# Patient Record
Sex: MALE | Race: WHITE | NOT HISPANIC OR LATINO | Employment: FULL TIME | URBAN - METROPOLITAN AREA
[De-identification: names, ages, dates, MRNs, and addresses within clinical notes are randomized per-mention and may not be internally consistent; named-entity substitution may affect disease eponyms.]

---

## 2018-11-04 ENCOUNTER — HOSPITAL ENCOUNTER (EMERGENCY)
Facility: HOSPITAL | Age: 33
Discharge: HOME | End: 2018-11-04
Attending: EMERGENCY MEDICINE | Admitting: EMERGENCY MEDICINE
Payer: COMMERCIAL

## 2018-11-04 ENCOUNTER — APPOINTMENT (EMERGENCY)
Dept: RADIOLOGY | Facility: HOSPITAL | Age: 33
End: 2018-11-04
Payer: COMMERCIAL

## 2018-11-04 VITALS
TEMPERATURE: 98.3 F | WEIGHT: 180.56 LBS | BODY MASS INDEX: 14.68 KG/M2 | RESPIRATION RATE: 20 BRPM | DIASTOLIC BLOOD PRESSURE: 50 MMHG | SYSTOLIC BLOOD PRESSURE: 102 MMHG | HEART RATE: 85 BPM

## 2018-11-04 DIAGNOSIS — S86.019A: Primary | ICD-10-CM

## 2018-11-04 PROCEDURE — 73610 X-RAY EXAM OF ANKLE: CPT

## 2018-11-04 PROCEDURE — 99283 EMERGENCY DEPT VISIT LOW MDM: CPT

## 2018-11-04 RX ORDER — NAPROXEN 500 MG/1
500 TABLET ORAL 2 TIMES DAILY WITH MEALS
Qty: 30 TABLET | Refills: 0 | Status: SHIPPED | OUTPATIENT
Start: 2018-11-04

## 2018-11-04 RX ORDER — IBUPROFEN 400 MG/1
800 TABLET ORAL ONCE
Status: COMPLETED | OUTPATIENT
Start: 2018-11-04 | End: 2018-11-04

## 2018-11-04 RX ORDER — OXYCODONE HYDROCHLORIDE AND ACETAMINOPHEN 5; 325 MG/1; MG/1
1 TABLET ORAL ONCE
Status: COMPLETED | OUTPATIENT
Start: 2018-11-04 | End: 2018-11-04

## 2018-11-04 RX ORDER — OXYCODONE HYDROCHLORIDE AND ACETAMINOPHEN 5; 325 MG/1; MG/1
1 TABLET ORAL EVERY 6 HOURS PRN
Qty: 20 TABLET | Refills: 0 | Status: SHIPPED | OUTPATIENT
Start: 2018-11-04 | End: 2018-11-14

## 2018-11-04 RX ADMIN — IBUPROFEN 800 MG: 400 TABLET ORAL at 16:10

## 2018-11-04 RX ADMIN — OXYCODONE AND ACETAMINOPHEN 1 TABLET: 5; 325 TABLET ORAL at 16:09

## 2018-11-04 NOTE — DISCHARGE INSTRUCTIONS
Achilles Tendon Rupture   WHAT YOU NEED TO KNOW:   What is an Achilles tendon rupture? An Achilles tendon rupture happens when your Achilles tendon tears, or separates from your heel bone  The Achilles tendon connects your calf muscle to your heel bone  It allows you to point your foot down and to rise on your toes  An Achilles tendon rupture may be caused by a sports injury or a fall  What increases my risk for an Achilles tendon rupture? · A previous Achilles tendon tear, rupture, or tendinitis    · A sudden increase in the intensity of an activity    · Tight calf muscles    · Arthritis, gout, or lupus    · Age older than 30    · Medicines such as steroids or antibiotics  What are the signs and symptoms of an Achilles tendon rupture? · A sudden pop, snap, or crack at the back of your leg    · Severe pain in your leg or ankle, especially when your foot is bent down    · Swelling, stiffness, or weakness in your leg or ankle    · A bruise on the back of your ankle    · Trouble moving or putting weight on your leg  How is an Achilles tendon rupture diagnosed? Tell your healthcare provider what you were doing when your symptoms started  He will bend your knee and squeeze the lower part of your calf  If your foot or ankle does not move, the tendon is torn  You may need an x-ray, ultrasound, or MRI to check for a tear or damage to your Achilles tendon  Do not enter the MRI room with anything metal  Metal can cause serious injury  Tell the healthcare provider if you have any metal in or on your body  How is an Achilles tendon rupture treated? · Medicine  can help decrease pain and swelling  · A cast, brace, or splint  may be used to keep your foot from moving  This will help your tendon heal  Your healthcare provider may change your cast and your foot position several times while the tendon heals  After several weeks of a cast or splint, you may need heel lifts placed in your shoes   This will decrease stress on your Achilles tendon  · Physical therapy  may be needed  A physical therapist teaches you exercises to help improve movement and strength, and decrease pain  You may not start physical therapy for a few weeks or until your cast is removed  · Surgery  may be needed if other treatments do not work  The edges of your tendon may need to be stitched back together  You may need a graft to patch the tear  A graft is a piece of another tendon or artificial material   What can I do to manage my symptoms? · Use a support device as directed  You may need crutches or a cane to decrease stress and pressure on your tendon  Your healthcare provider will tell you how much weight you can put on your leg  Ask for more information about how to use crutches or a cane correctly  Wear your brace or splint as directed  This devices will keep your tendon straight and help it heal      · Rest  as directed  Your healthcare provider will tell you when it is okay to walk and play sports  You may not be able to play sports for 6 months or longer  Ask when you can go back to work or school  Do not drive until your healthcare provider says it is okay  · Apply ice  on your Achilles tendon for 15 to 20 minutes every hour or as directed  Use an ice pack, or put crushed ice in a plastic bag  Cover it with a towel  Ice helps prevent tissue damage and decreases swelling and pain  · Elevate  your heel above the level of your heart as often as you can  This will help decrease swelling and pain  Prop your heel on pillows or blankets to keep it elevated comfortably  When should I seek immediate care? · Your leg feels warm, tender, and painful  It may look swollen and red  · Your foot or toes are numb  When should I contact my healthcare provider? · You have a fever  · Your symptoms do not get better with treatment  · You have questions or concerns about your condition or care    CARE AGREEMENT:   You have the right to help plan your care  Learn about your health condition and how it may be treated  Discuss treatment options with your caregivers to decide what care you want to receive  You always have the right to refuse treatment  The above information is an  only  It is not intended as medical advice for individual conditions or treatments  Talk to your doctor, nurse or pharmacist before following any medical regimen to see if it is safe and effective for you  © 2017 [employer]  Information is for End User's use only and may not be sold, redistributed or otherwise used for commercial purposes  All illustrations and images included in CareNotes® are the copyrighted property of [employer]  or [employer]

## 2018-11-04 NOTE — ED PROVIDER NOTES
History  Chief Complaint   Patient presents with    Leg Injury     injury to right lower ext  while playing softball, felt a pull and pop while running     78-year-old male presents to the emergency department for evaluation of acute onset of right calf pain  Patient states that he was playing softball, he was rounding 2nd base when he felt a sharp pain in the calf and felt a pop sensation  Patient states that he was able to walk off field but is not able to bear weight on the leg now without severe pain  Patient did not fall and denies other associated injuries  Patient localizes pain to the mid calf on the right  He has pain when he attempts to plantar flex the foot  No knee or ankle pain  History provided by:  Patient, medical records and significant other  Leg Pain   Location:  Leg  Injury: yes    Mechanism of injury comment:  Felt a pop sensation when running  Leg location:  R lower leg  Pain details:     Quality:  Tearing and throbbing    Radiates to:  Does not radiate    Severity:  Severe    Onset quality:  Sudden    Timing:  Constant    Progression:  Unchanged  Chronicity:  New  Dislocation: no    Foreign body present:  No foreign bodies  Prior injury to area:  No  Relieved by:  Nothing  Worsened by:  Bearing weight and flexion  Ineffective treatments:  None tried  Associated symptoms: muscle weakness and swelling    Associated symptoms: no back pain, no fatigue and no fever    Risk factors: no obesity        None       No past medical history on file  Past Surgical History:   Procedure Laterality Date    BLADDER SURGERY         No family history on file  I have reviewed and agree with the history as documented  Social History   Substance Use Topics    Smoking status: Current Every Day Smoker    Smokeless tobacco: Not on file    Alcohol use No        Review of Systems   Constitutional: Negative for fatigue and fever  Musculoskeletal: Positive for gait problem and myalgias  Negative for arthralgias and back pain  All other systems reviewed and are negative  Physical Exam  Physical Exam   Constitutional: He is oriented to person, place, and time  He appears well-developed and well-nourished  HENT:   Head: Normocephalic  Right Ear: External ear normal    Left Ear: External ear normal    Nose: Nose normal    Mouth/Throat: Oropharynx is clear and moist    Eyes: Pupils are equal, round, and reactive to light  EOM and lids are normal    Neck: Normal range of motion  Neck supple  Pulmonary/Chest: Effort normal  No respiratory distress  Musculoskeletal: Normal range of motion  He exhibits no deformity  Right ankle: Achilles tendon exhibits pain and defect (no complete disruption of tendon)  Right lower leg: He exhibits tenderness and swelling  He exhibits no deformity  Legs:       Right foot: Normal    Ortiz test is abnormal - the foot has minimal flexion with squeezing of the calf while the patient is kneeling with foot hanging off the chair   Neurological: He is alert and oriented to person, place, and time  Skin: Skin is warm and dry  Psychiatric: He has a normal mood and affect  Nursing note and vitals reviewed        Vital Signs  ED Triage Vitals   Temperature Pulse Respirations Blood Pressure SpO2   11/04/18 1549 11/04/18 1549 11/04/18 1549 11/04/18 1549 --   98 3 °F (36 8 °C) 85 20 102/50       Temp src Heart Rate Source Patient Position - Orthostatic VS BP Location FiO2 (%)   -- -- -- -- --             Pain Score       11/04/18 1609       8           Vitals:    11/04/18 1549   BP: 102/50   Pulse: 85       Visual Acuity      ED Medications  Medications   ibuprofen (MOTRIN) tablet 800 mg (800 mg Oral Given 11/4/18 1610)   oxyCODONE-acetaminophen (PERCOCET) 5-325 mg per tablet 1 tablet (1 tablet Oral Given 11/4/18 1609)       Diagnostic Studies  Results Reviewed     None                 XR ankle 3+ views RIGHT    (Results Pending) Fullness/edema in the achilles triangle   Procedures  Static Splint Application  Date/Time: 11/4/2018 5:18 PM  Performed by: LADAN AMEZQUITA  Authorized by: Francisca Boothe     Patient location:  Bedside  Procedure performed by emergency physician: No    Other Assisting Provider: Yes (comment)    Consent:     Consent obtained:  Verbal    Consent given by:  Patient    Risks discussed:  Pain and swelling  Universal protocol:     Patient identity confirmed:  Verbally with patient  Indication:     Indications: sprain/strain and other medical problem (comment)    Pre-procedure details:     Sensation:  Normal  Procedure details:     Laterality:  Right    Location:  Leg    Leg:  R lower leg    Splint type:  Short leg    Supplies:  Ortho-Glass  Post-procedure details:     Pain:  Improved    Sensation:  Normal    Neurovascular Exam: skin pink      Patient tolerance of procedure: Tolerated well, no immediate complications           Phone Contacts  ED Phone Contact    ED Course                               MDM  Number of Diagnoses or Management Options  Partial rupture of Achilles tendon, initial encounter: new and requires workup     Amount and/or Complexity of Data Reviewed  Tests in the radiology section of CPT®: ordered and reviewed  Decide to obtain previous medical records or to obtain history from someone other than the patient: yes  Obtain history from someone other than the patient: yes  Independent visualization of images, tracings, or specimens: yes    Risk of Complications, Morbidity, and/or Mortality  General comments: 65-year-old male with acute right calf pain while running - patient has significant pain in the right calf and appears to have weakness with plantar flexion of the foot  I suspect that he has a partial Achilles tendon disruption  His splint has been placed and he has been given crutchesAnd pain medication  He will contact Orthopedics for follow-up tomorrow      Patient Progress  Patient progress: stable    CritCare Time    Disposition  Final diagnoses:   Partial rupture of Achilles tendon, initial encounter     Time reflects when diagnosis was documented in both MDM as applicable and the Disposition within this note     Time User Action Codes Description Comment    11/4/2018  4:36 PM Yu Soliman Add [[id number]] Partial rupture of Achilles tendon, initial encounter       ED Disposition     ED Disposition Condition Comment    Discharge  Payal Junior discharge to home/self care  Condition at discharge: Stable        Follow-up Information     Follow up With Specialties Details Why Contact Info Additional [address] [facility] [place] [facility] Schedule an appointment as soon as possible for a visit in 1 day For recheck of current symptoms [facility] [phone]  [address], [address]          Discharge Medication List as of 11/4/2018  4:38 PM      START taking these medications    Details   naproxen (NAPROSYN) 500 mg tablet Take 1 tablet (500 mg total) by mouth 2 (two) times a day with meals, Starting Sun 11/4/2018, Normal      oxyCODONE-acetaminophen (PERCOCET) 5-325 mg per tablet Take 1 tablet by mouth every 6 (six) hours as needed for moderate pain (May use up to two tablets every 6 hours ) for up to 10 days Max Daily Amount: 4 tablets, Starting Sun 11/4/2018, Until Wed 11/14/2018, Print           No discharge procedures on file      ED Provider  Electronically Signed by           Carolyn Cummings DO  11/04/18 6638

## 2018-11-04 NOTE — ED NOTES
Pulses and neuro checked in right foot and toes before and after placement of splint  No deficits noted       Delfina Enrique  11/04/18 5211

## 2018-11-06 VITALS
SYSTOLIC BLOOD PRESSURE: 112 MMHG | HEIGHT: 68 IN | DIASTOLIC BLOOD PRESSURE: 71 MMHG | BODY MASS INDEX: 27.45 KG/M2 | HEART RATE: 73 BPM

## 2018-11-06 DIAGNOSIS — S86.111A GASTROCNEMIUS STRAIN, RIGHT, INITIAL ENCOUNTER: Primary | ICD-10-CM

## 2018-11-06 PROCEDURE — 99203 OFFICE O/P NEW LOW 30 MIN: CPT | Performed by: ORTHOPAEDIC SURGERY

## 2018-11-06 NOTE — PROGRESS NOTES
JEFE Kohler  Attending, Orthopaedic Surgery  [facility] [address] [facility]        ORTHOPAEDIC FOOT AND ANKLE CLINIC VISIT     Assessment:     Encounter Diagnosis   Name Primary?  Gastrocnemius strain, right, initial encounter Yes        Plan:   · The patient verbalized understanding of exam findings and treatment plan  We engaged in the shared decision-making process and treatment options were discussed at length with the patient  Surgical and conservative management discussed today along with risks and benefits  · He is to wear high tide cam walker boot  · May continue with naproxen for pain control  · He may initiate RICE  · Work restrictions given  Return in about 4 weeks (around 12/4/2018) for Recheck right gastroc  Alda Prows History of Present Illness:   Chief Complaint:   Chief Complaint   Patient presents with    Right Ankle - Pain     Benedict Lopez is a 28 y o  male who is being seen for right calf pain x 2 days  He states he was playing softball and when he was rounding 2nd base he felt a pull and a pop  Pain is localized at right calf muscle with minimal radiating and described as sharp and severe  Patient denies numbness, tingling or radicular pain  Denies history of neuropathy  Patient does not smoke, does not have diabetes and does not take blood thinners  Patient denies family history of anesthesia complications and has not had any complications with anesthesia  Pain/symptom timing:  Worse during the day when active  Pain/symptom context:  Worse with activites and work  Pain/symptom modifying factors:  Rest makes better, activities make worse  Pain/symptom associated signs/symptoms: none    Prior treatment   · NSAIDsYes    · Injections No   · Bracing/Orthotics No   · Physical Therapy No     Orthopedic Surgical History:   none    Past Medical, Surgical and Social History:  Past Medical History:  has no past medical history on file    Problem List:  does not have a problem list on file  Past Surgical History:  has a past surgical history that includes Bladder surgery  Family History: family history is not on file  Social History:  reports that he has been smoking  He has never used smokeless tobacco  He reports that he does not drink alcohol or use drugs  Current Medications: has a current medication list which includes the following prescription(s): naproxen and oxycodone-acetaminophen  Allergies: has No Known Allergies  Review of Systems:  General- denies fever/chills  HEENT- denies hearing loss or sore throat  Eyes- denies eye pain or visual disturbances, denies red eyes  Respiratory- denies cough or SOB  Cardio- denies chest pain or palpitations  GI- denies abdominal pain  Endocrine- denies urinary frequency  Urinary- denies pain with urination  Musculoskeletal- Negative except noted above  Skin- denies rashes or wounds  Neurological- denies dizziness or headache  Psychiatric- denies anxiety or difficulty concentrating    Physical Exam:   /71 (BP Location: Right arm, Patient Position: Sitting, Cuff Size: Adult)   Pulse 73   Ht 5' 8" (1 727 m)   BMI 27 45 kg/m²   General/Constitutional: No apparent distress: well-nourished and well developed  Eyes: normal ocular motion  Lymphatic: No appreciable lymphadenopathy  Respiratory: Non-labored breathing  Vascular: No edema, swelling or tenderness, except as noted in detailed exam   Integumentary: No impressive skin lesions present, except as noted in detailed exam   Neuro: No ataxia or tremors noted  Psych: Normal mood and affect, oriented to person, place and time  Appropriate affect  Musculoskeletal: Normal, except as noted in detailed exam and in HPI  Examination    Right    Gait He is non weight bearing with crutch assistance     Musculoskeletal Tender to palpation at right calf muscle    Skin Normal       Nails Normal    Range of Motion  15 degrees dorsiflexion, 30 degrees plantarflexion  Subtalar motion: normal    Stability Stable    Muscle Strength 5/5 tibialis anterior  4/5 gastrocnemius-soleus  5/5 posterior tibialis  5/5 peroneal/eversion strength  5/5 EHL  5/5 FHL    Neurologic Normal    Sensation Intact to light touch throughout sural, saphenous, superficial peroneal, deep peroneal and medial/lateral plantar nerve distributions  Bowman-Sandro 5 07 filament (10g) testing deferred  Cardiovascular Brisk capillary refill < 2 seconds,intact DP and PT pulses    Special Tests Ortiz Test:  Negative      Imaging Studies:   3 views of the right ankle were taken, reviewed and interpreted independently that demonstrate no fracture or dislocation present  Reviewed by me personally  Scribe Attestation    I,:   Mai Escobar am acting as a scribe while in the presence of the attending physician :        I,:   Davis Carrel, MD personally performed the services described in this documentation    as scribed in my presence :          Angelina Cinnamon Lachman, MD  Foot & Ankle Surgery   Department of [address] [place] [address]      I personally performed the service  Angelina Cinnamon Lachman, MD

## 2018-11-06 NOTE — LETTER
November 6, 2018     Patient: Edin Davis   YOB: 1985   Date of Visit: 11/6/2018       To Whom it May Concern:    Roxanna Rios is under my professional care  He was seen in my office on 11/6/2018  Torin Hall is to be on light duty with no walking or standing on his right lower extremity x 2 weeks  If you have any questions or concerns, please don't hesitate to call           Sincerely,          Bri Pitts MD        CC: Edin Ryan

## 2018-11-21 ENCOUNTER — TELEPHONE (OUTPATIENT)
Dept: OBGYN CLINIC | Facility: HOSPITAL | Age: 33
End: 2018-11-21

## 2018-11-21 NOTE — TELEPHONE ENCOUNTER
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

## 2018-11-21 NOTE — TELEPHONE ENCOUNTER
Patient is calling requesting to have a new note written so that he can work full duty  Patient seems confused because he wants a note that he can work his normal job most of the time but not all and I said that would be considered light duty like he is   Patient said that he would just like the note to work full duty and that his employer said that they would work with him about taking breaks and working a bit easier than normal     Lachman pt    Fax- [phone]

## 2018-11-21 NOTE — TELEPHONE ENCOUNTER
Patient called in wanting to know the status on his work note  Best contact# [phone] please advise

## 2022-03-14 ENCOUNTER — APPOINTMENT (OUTPATIENT)
Dept: RADIOLOGY | Facility: CLINIC | Age: 37
End: 2022-03-14
Payer: OTHER MISCELLANEOUS

## 2022-03-14 DIAGNOSIS — S79.911A HIP INJURY, RIGHT, INITIAL ENCOUNTER: ICD-10-CM

## 2022-03-14 PROCEDURE — 73502 X-RAY EXAM HIP UNI 2-3 VIEWS: CPT

## 2022-07-11 ENCOUNTER — TELEPHONE (OUTPATIENT)
Dept: OBGYN CLINIC | Facility: HOSPITAL | Age: 37
End: 2022-07-11

## 2022-07-11 NOTE — TELEPHONE ENCOUNTER
Called to confirm if patient was seen by us  Advised appointment was cancelled  Understanding and appreciation verbalized

## 2024-09-29 ENCOUNTER — HOSPITAL ENCOUNTER (EMERGENCY)
Facility: HOSPITAL | Age: 39
Discharge: HOME | End: 2024-09-29
Attending: EMERGENCY MEDICINE | Admitting: EMERGENCY MEDICINE
Payer: COMMERCIAL

## 2024-09-29 ENCOUNTER — APPOINTMENT (EMERGENCY)
Dept: RADIOLOGY | Facility: HOSPITAL | Age: 39
End: 2024-09-29
Payer: COMMERCIAL

## 2024-09-29 VITALS
DIASTOLIC BLOOD PRESSURE: 71 MMHG | OXYGEN SATURATION: 100 % | HEIGHT: 68 IN | HEART RATE: 76 BPM | RESPIRATION RATE: 16 BRPM | WEIGHT: 194.89 LBS | BODY MASS INDEX: 29.54 KG/M2 | SYSTOLIC BLOOD PRESSURE: 131 MMHG | TEMPERATURE: 98 F

## 2024-09-29 DIAGNOSIS — M54.10 RADICULOPATHY: Primary | ICD-10-CM

## 2024-09-29 DIAGNOSIS — R20.2 PARESTHESIAS: ICD-10-CM

## 2024-09-29 PROCEDURE — 99284 EMERGENCY DEPT VISIT MOD MDM: CPT | Performed by: EMERGENCY MEDICINE

## 2024-09-29 PROCEDURE — 99283 EMERGENCY DEPT VISIT LOW MDM: CPT

## 2024-09-29 PROCEDURE — 72040 X-RAY EXAM NECK SPINE 2-3 VW: CPT

## 2024-09-29 RX ORDER — ACETAMINOPHEN 325 MG/1
650 TABLET ORAL ONCE
Status: DISCONTINUED | OUTPATIENT
Start: 2024-09-29 | End: 2024-09-29 | Stop reason: HOSPADM

## 2024-09-29 RX ORDER — PREDNISONE 20 MG/1
40 TABLET ORAL DAILY
Qty: 8 TABLET | Refills: 0 | Status: SHIPPED | OUTPATIENT
Start: 2024-09-30 | End: 2024-09-29

## 2024-09-29 RX ORDER — METHOCARBAMOL 500 MG/1
500 TABLET, FILM COATED ORAL ONCE
Status: DISCONTINUED | OUTPATIENT
Start: 2024-09-29 | End: 2024-09-29 | Stop reason: HOSPADM

## 2024-09-29 RX ORDER — PREDNISONE 20 MG/1
40 TABLET ORAL DAILY
Qty: 8 TABLET | Refills: 0 | Status: SHIPPED | OUTPATIENT
Start: 2024-09-30 | End: 2024-10-04

## 2024-09-29 RX ORDER — PREDNISONE 20 MG/1
60 TABLET ORAL ONCE
Status: DISCONTINUED | OUTPATIENT
Start: 2024-09-29 | End: 2024-09-29 | Stop reason: HOSPADM

## 2024-09-29 RX ORDER — LIDOCAINE 50 MG/G
1 PATCH TOPICAL ONCE
Status: DISCONTINUED | OUTPATIENT
Start: 2024-09-29 | End: 2024-09-29 | Stop reason: HOSPADM

## 2024-09-29 RX ORDER — METHOCARBAMOL 500 MG/1
500 TABLET, FILM COATED ORAL 2 TIMES DAILY
Qty: 20 TABLET | Refills: 0 | Status: SHIPPED | OUTPATIENT
Start: 2024-09-29

## 2024-09-29 NOTE — ED PROVIDER NOTES
Final diagnoses:   Radiculopathy   Paresthesias     ED Disposition       ED Disposition   Discharge    Condition   Stable    Date/Time   Sun Sep 29, 2024  5:47 PM    Comment   Fahad Cobian discharge to home/self care.                   Assessment & Plan       Medical Decision Making  Ddx djd cervical radiculopathy, degenerative disc disease, compression fx pinched nerve    Problems Addressed:  Paresthesias: acute illness or injury  Radiculopathy: acute illness or injury    Amount and/or Complexity of Data Reviewed  Radiology: ordered and independent interpretation performed. Decision-making details documented in ED Course.    Risk  OTC drugs.  Prescription drug management.  Risk Details: Discussed with patient Motrin or Tylenol over-the-counter also wrote prescriptions for prednisone Robaxin and naproxen.  Follow-up with [facility]             Medications   acetaminophen (TYLENOL) tablet 650 mg (has no administration in time range)   methocarbamol (ROBAXIN) tablet 500 mg (has no administration in time range)   lidocaine (LIDODERM) 5 % patch 1 patch (has no administration in time range)   predniSONE tablet 60 mg (has no administration in time range)       ED Risk Strat Scores                                               History of Present Illness       Chief Complaint   Patient presents with    Tingling     R sided arm tingling radiating from neck  x2 weeks       Past Medical History:   Diagnosis Date    Ear problems     HL (hearing loss)       Past Surgical History:   Procedure Laterality Date    BLADDER SURGERY        History reviewed. No pertinent family history.   Social History     Tobacco Use    Smoking status: Every Day     Current packs/day: 0.50     Types: Cigarettes    Smokeless tobacco: Never   Vaping Use    Vaping status: Never Used   Substance Use Topics    Alcohol use: No    Drug use: No      E-Cigarette/Vaping    E-Cigarette Use Never User       E-Cigarette/Vaping  Substances      I have reviewed and agree with the history as documented.     38 yo male comes in for neck and arm pain. Pt states 2 weeks ago began to have pain shooting down neck into arm. Getting progressively worse. Now tingling in arm hx of similar but uss went away with rest and otc meds.no fever. No known injury.       History provided by:  Spouse   used: No    Neck Pain  Pain location:  R side  Quality:  Stabbing  Pain radiates to:  R arm, R forearm, R hand and R shoulder  Pain severity:  Severe  Pain is:  Same all the time  Onset quality:  Gradual  Duration:  2 weeks  Timing:  Constant  Progression:  Worsening  Chronicity:  New  Context: not recent injury    Ineffective treatments:  Analgesics, muscle relaxants, heat, NSAIDs and ice  Associated symptoms: numbness and paresis    Associated symptoms: no bladder incontinence, no chest pain, no headaches and no weakness    Risk factors: no hx of head and neck radiation and no recent head injury        Review of Systems   Constitutional:  Negative for activity change and appetite change.   HENT:  Negative for congestion and facial swelling.    Eyes:  Negative for discharge and redness.   Respiratory:  Negative for cough and wheezing.    Cardiovascular:  Negative for chest pain and leg swelling.   Gastrointestinal:  Negative for abdominal distention, abdominal pain and blood in stool.   Endocrine: Negative for cold intolerance and polydipsia.   Genitourinary:  Negative for bladder incontinence, difficulty urinating and hematuria.   Musculoskeletal:  Positive for neck pain. Negative for arthralgias and gait problem.   Skin:  Negative for color change and rash.   Allergic/Immunologic: Negative for food allergies and immunocompromised state.   Neurological:  Positive for numbness. Negative for dizziness, seizures, weakness and headaches.   Hematological:  Negative for adenopathy. Does not bruise/bleed easily.   Psychiatric/Behavioral:  Negative  for agitation, confusion and decreased concentration.    All other systems reviewed and are negative.          Objective       ED Triage Vitals [09/29/24 1651]   Temperature Pulse Blood Pressure Respirations SpO2 Patient Position - Orthostatic VS   98 °F (36.7 °C) 76 131/71 16 100 % Lying      Temp Source Heart Rate Source BP Location FiO2 (%) Pain Score    Oral Monitor Left arm -- 7      Vitals      Date and Time Temp Pulse SpO2 Resp BP Pain Score FACES Pain Rating User   09/29/24 1651 98 °F (36.7 °C) 76 100 % 16 131/71 7 -- EJN            Physical Exam  Vitals and nursing note reviewed.   Constitutional:       Appearance: He is well-developed. He is not toxic-appearing.   HENT:      Head: Normocephalic and atraumatic.      Right Ear: Tympanic membrane normal.      Left Ear: Tympanic membrane normal.      Nose: Nose normal.   Eyes:      General: Lids are normal.      Conjunctiva/sclera: Conjunctivae normal.      Pupils: Pupils are equal, round, and reactive to light.   Neck:      Vascular: No carotid bruit or JVD.      Trachea: Trachea normal.   Cardiovascular:      Rate and Rhythm: Normal rate and regular rhythm. No extrasystoles are present.     Heart sounds: Normal heart sounds.   Pulmonary:      Effort: Pulmonary effort is normal.      Breath sounds: No decreased breath sounds, wheezing, rhonchi or rales.   Chest:      Chest wall: No deformity or tenderness.   Abdominal:      General: Bowel sounds are normal.      Palpations: Abdomen is soft.      Tenderness: There is no abdominal tenderness. There is no guarding or rebound.   Musculoskeletal:      Right shoulder: No swelling, deformity or tenderness. Normal range of motion.      Cervical back: Neck supple. Spasms and tenderness present. No deformity or bony tenderness. Pain with movement present. Decreased range of motion.   Lymphadenopathy:      Cervical: No cervical adenopathy.   Skin:     General: Skin is warm and dry.   Neurological:      Mental Status: He  is alert and oriented to person, place, and time.      Cranial Nerves: No cranial nerve deficit.      Sensory: No sensory deficit.      Deep Tendon Reflexes: Reflexes are normal and symmetric.   Psychiatric:         Speech: Speech normal.         Behavior: Behavior normal.         Thought Content: Thought content normal.         Judgment: Judgment normal.         Results Reviewed       None            XR cervical spine 2 or 3 views   ED Interpretation by Milka Mathis DO (09/29 3797)   Straightening lumbar lordosis          Procedures    ED Medication and Procedure Management   Prior to Admission Medications   Prescriptions Last Dose Informant Patient Reported? Taking?   methylPREDNISolone 4 MG tablet therapy pack   Yes No   Sig: TK AS DIRECTED   naproxen (NAPROSYN) 500 mg tablet   No No   Sig: Take 1 tablet (500 mg total) by mouth 2 (two) times a day with meals   Patient not taking: Reported on 9/30/2020   ofloxacin (FLOXIN) 0.3 % otic solution   Yes No   Sig: INT 5 GTS IN LEFT EAR D FOR 7 DAYS      Facility-Administered Medications: None     Discharge Medication List as of 9/29/2024  5:49 PM        START taking these medications    Details   methocarbamol (ROBAXIN) 500 mg tablet Take 1 tablet (500 mg total) by mouth 2 (two) times a day, Starting Sun 9/29/2024, Normal      predniSONE 20 mg tablet Take 2 tablets (40 mg total) by mouth daily for 4 days Take 3 tabs daily for 4 days Do not start before September 30, 2024., Starting Mon 9/30/2024, Until Fri 10/4/2024, Print           CONTINUE these medications which have CHANGED    Details   naproxen (NAPROSYN) 375 mg tablet Take 1 tablet (375 mg total) by mouth 2 (two) times a day with meals, Starting Sun 9/29/2024, Normal           STOP taking these medications       methylPREDNISolone 4 MG tablet therapy pack Comments:   Reason for Stopping:         ofloxacin (FLOXIN) 0.3 % otic solution Comments:   Reason for Stopping:               ED SEPSIS DOCUMENTATION    Time reflects when diagnosis was documented in both MDM as applicable and the Disposition within this note       Time User Action Codes Description Comment    9/29/2024  5:47 PM Milka Mathis [M54.10] Radiculopathy     9/29/2024  5:47 PM Milka Mathis [R20.2] Paresthesias                  Milka Mathis, DO  09/29/24 1855

## 2024-10-01 ENCOUNTER — TELEPHONE (OUTPATIENT)
Dept: PHYSICAL THERAPY | Facility: OTHER | Age: 39
End: 2024-10-01

## 2024-10-01 NOTE — TELEPHONE ENCOUNTER
Call placed to the patient per [facility] referral.    Phone kept ringing, no voicemail machine picked up. Unable to leave a message.    This is the 1st attempt to reach the patient. Will defer referral per protocol.    Pt has an appt scheduled with [facility] on 10/17 (same dx).

## 2024-10-01 NOTE — TELEPHONE ENCOUNTER
Patient called into [facility] today regarding a missed call from us.    Spoke with patient, explained program, protocol and what we offer.    Patient states he has an appt scheduled with Pain Management ([facility] pain). I did explain the difference between PT and PM.    Patient declined services for now and will call back if needed.    [facility] phone number and hours of business provided.    [facility] referral closed per protocol.